# Patient Record
Sex: FEMALE | Race: WHITE | NOT HISPANIC OR LATINO | URBAN - METROPOLITAN AREA
[De-identification: names, ages, dates, MRNs, and addresses within clinical notes are randomized per-mention and may not be internally consistent; named-entity substitution may affect disease eponyms.]

---

## 2021-05-07 ENCOUNTER — APPOINTMENT (RX ONLY)
Dept: URBAN - METROPOLITAN AREA CLINIC 170 | Facility: CLINIC | Age: 26
Setting detail: DERMATOLOGY
End: 2021-05-07

## 2021-05-07 DIAGNOSIS — L80 VITILIGO: ICD-10-CM

## 2021-05-07 PROCEDURE — ? PRESCRIPTION

## 2021-05-07 PROCEDURE — ? COUNSELING

## 2021-05-07 PROCEDURE — ? ADDITIONAL NOTES

## 2021-05-07 PROCEDURE — ? PHOTO-DOCUMENTATION

## 2021-05-07 PROCEDURE — 99203 OFFICE O/P NEW LOW 30 MIN: CPT

## 2021-05-07 RX ORDER — TACROLIMUS 1 MG/G
OINTMENT TOPICAL
Qty: 1 | Refills: 2 | Status: ERX | COMMUNITY
Start: 2021-05-07

## 2021-05-07 RX ORDER — TRIAMCINOLONE ACETONIDE 1 MG/G
CREAM TOPICAL
Qty: 1 | Refills: 2 | Status: ERX | COMMUNITY
Start: 2021-05-07

## 2021-05-07 RX ADMIN — TACROLIMUS: 1 OINTMENT TOPICAL at 00:00

## 2021-05-07 RX ADMIN — TRIAMCINOLONE ACETONIDE: 1 CREAM TOPICAL at 00:00

## 2021-05-07 NOTE — PROCEDURE: ADDITIONAL NOTES
Detail Level: Simple
Additional Notes: Patient consent was obtained to proceed with the visit and recommended plan of care after discussion of all risks and benefits, including the risks of COVID-19 exposure.
02-Feb-2020 18:47

## 2021-05-11 ENCOUNTER — RX ONLY (OUTPATIENT)
Age: 26
Setting detail: RX ONLY
End: 2021-05-11

## 2021-05-11 RX ORDER — PIMECROLIMUS 10 MG/G
CREAM TOPICAL
Qty: 1 | Refills: 2 | Status: ERX | COMMUNITY
Start: 2021-05-11

## 2022-05-26 ENCOUNTER — APPOINTMENT (RX ONLY)
Dept: URBAN - METROPOLITAN AREA CLINIC 170 | Facility: CLINIC | Age: 27
Setting detail: DERMATOLOGY
End: 2022-05-26

## 2022-05-26 DIAGNOSIS — L80 VITILIGO: ICD-10-CM

## 2022-05-26 DIAGNOSIS — L20.89 OTHER ATOPIC DERMATITIS: ICD-10-CM | Status: INADEQUATELY CONTROLLED

## 2022-05-26 PROBLEM — L20.84 INTRINSIC (ALLERGIC) ECZEMA: Status: ACTIVE | Noted: 2022-05-26

## 2022-05-26 PROCEDURE — ? COUNSELING

## 2022-05-26 PROCEDURE — 99214 OFFICE O/P EST MOD 30 MIN: CPT

## 2022-05-26 PROCEDURE — ? ADDITIONAL NOTES

## 2022-05-26 PROCEDURE — ? PRESCRIPTION

## 2022-05-26 RX ORDER — TACROLIMUS 1 MG/G
OINTMENT TOPICAL
Qty: 100 | Refills: 2 | Status: ERX | COMMUNITY
Start: 2022-05-26

## 2022-05-26 RX ORDER — TRIAMCINOLONE ACETONIDE 1 MG/G
OINTMENT TOPICAL
Qty: 80 | Refills: 1 | Status: ERX | COMMUNITY
Start: 2022-05-26

## 2022-05-26 RX ADMIN — TACROLIMUS: 1 OINTMENT TOPICAL at 00:00

## 2022-05-26 RX ADMIN — TRIAMCINOLONE ACETONIDE: 1 OINTMENT TOPICAL at 00:00

## 2022-05-26 ASSESSMENT — LOCATION DETAILED DESCRIPTION DERM
LOCATION DETAILED: RIGHT LATERAL SUPERIOR EYELID
LOCATION DETAILED: LEFT CENTRAL EYEBROW

## 2022-05-26 ASSESSMENT — LOCATION ZONE DERM
LOCATION ZONE: FACE
LOCATION ZONE: EYELID

## 2022-05-26 ASSESSMENT — LOCATION SIMPLE DESCRIPTION DERM
LOCATION SIMPLE: LEFT EYEBROW
LOCATION SIMPLE: RIGHT SUPERIOR EYELID

## 2024-06-17 ENCOUNTER — OFFICE VISIT (OUTPATIENT)
Age: 29
End: 2024-06-17

## 2024-06-17 VITALS
SYSTOLIC BLOOD PRESSURE: 138 MMHG | DIASTOLIC BLOOD PRESSURE: 81 MMHG | HEART RATE: 102 BPM | BODY MASS INDEX: 32.37 KG/M2 | HEIGHT: 65 IN | OXYGEN SATURATION: 94 % | WEIGHT: 194.3 LBS | TEMPERATURE: 97.8 F

## 2024-06-17 DIAGNOSIS — H57.89 IRRITATION OF LEFT EYE: Primary | ICD-10-CM

## 2024-06-17 RX ORDER — METHIMAZOLE 5 MG/1
10 TABLET ORAL ONCE
COMMUNITY

## 2024-06-17 RX ORDER — PROPRANOLOL HYDROCHLORIDE 20 MG/1
60 TABLET ORAL 3 TIMES DAILY
COMMUNITY
Start: 2023-11-17

## 2024-06-17 ASSESSMENT — ENCOUNTER SYMPTOMS
EYE REDNESS: 0
EYE ITCHING: 0
DOUBLE VISION: 0
NAUSEA: 0
BLURRED VISION: 1
FOREIGN BODY SENSATION: 0
EYE DISCHARGE: 0

## 2024-06-17 ASSESSMENT — VISUAL ACUITY: OU: 1

## 2024-06-17 NOTE — PATIENT INSTRUCTIONS
Reassuring exam without redness and no visual acuity deficit.  Suspect chemical exposure to eye causing irritation.  Improved symptoms over night is reassuring.  Recommend and provided with saline drop for continuous flushing today and as needed.  If symptom worsens including vision change, increased pain or redness, recommend re-evalution.  May require continuous irrigation and recommend to ED.  Can also consider Urgent Op[facility] for further evaluation.    [facility] [place]  [facility]  [address]  M-F 8-5; Sat 8-12  [phone]  Call to setup appointment for evaluation if needed

## 2024-06-17 NOTE — PROGRESS NOTES
Yaritza RODRÍGUEZ (:  1995) is a 28 y.o. female,New patient, here for evaluation of the following chief complaint(s):  Eye Problem (Left eye blurry, slight swollen on eyelid since yesterday. Patient states she went swimming previously to eye irritation.  )      ASSESSMENT/PLAN:  Visit Diagnoses and Associated Orders       Irritation of left eye    -  Primary         ORDERS WITHOUT AN ASSOCIATED DIAGNOSIS    methIMAzole (TAPAZOLE) 5 MG tablet [91317]      propranolol (INDERAL) 20 MG tablet [8557]             Reassuring exam without redness and no visual acuity deficit.  Suspect chemical exposure to eye causing irritation.  Improved symptoms over night is reassuring.  Recommend and provided with saline drop for continuous flushing today and as needed.  If symptom worsens including vision change, increased pain or redness, recommend re-evalution.  May require continuous irrigation and recommend to ED.  Can also consider Urgent Ophthalmology for further evaluation.      SUBJECTIVE/OBJECTIVE:    Yaritza here today c/o left eye burning.  States yesterday had burning sensation around the eye.  Not sure about exposure but was swimming prior.  Reports having irritation on right hand and mouth as well and mouth that lasted briefly and wonders is she touched something on a surface.  Has been rinsing eye with water.  Denies discharge or crusting.  Improved this morning compared to last night.  Stayed home from work today.      History provided by:  Patient   used: No    Eye Problem   The left eye is affected. This is a new problem. The current episode started yesterday. The problem has been gradually improving. The injury mechanism was a chemical exposure. The pain is mild. There is No known exposure to pink eye. She Does not wear contacts. Associated symptoms include blurred vision. Pertinent negatives include no eye discharge, double vision, eye redness, fever, foreign body sensation, itching, nausea

## 2025-08-26 ENCOUNTER — OFFICE VISIT (OUTPATIENT)
Age: 30
End: 2025-08-26

## 2025-08-26 VITALS
TEMPERATURE: 98.4 F | DIASTOLIC BLOOD PRESSURE: 81 MMHG | WEIGHT: 190 LBS | OXYGEN SATURATION: 96 % | SYSTOLIC BLOOD PRESSURE: 129 MMHG | BODY MASS INDEX: 29.82 KG/M2 | HEART RATE: 73 BPM | HEIGHT: 67 IN

## 2025-08-26 DIAGNOSIS — J01.00 ACUTE MAXILLARY SINUSITIS, RECURRENCE NOT SPECIFIED: Primary | ICD-10-CM

## 2025-08-26 DIAGNOSIS — R09.81 CONGESTION OF NASAL SINUS: ICD-10-CM

## 2025-08-26 LAB
INFLUENZA VIRUS A RNA: NEGATIVE
INFLUENZA VIRUS B RNA: NEGATIVE

## 2025-08-26 RX ORDER — FLUTICASONE PROPIONATE 50 MCG
1 SPRAY, SUSPENSION (ML) NASAL DAILY
Qty: 16 G | Refills: 0 | Status: SHIPPED | OUTPATIENT
Start: 2025-08-26

## 2025-08-26 RX ORDER — AZITHROMYCIN 250 MG/1
TABLET, FILM COATED ORAL
Qty: 6 TABLET | Refills: 0 | Status: SHIPPED | OUTPATIENT
Start: 2025-08-26 | End: 2025-09-05

## 2025-08-26 ASSESSMENT — ENCOUNTER SYMPTOMS: SORE THROAT: 1
